# Patient Record
Sex: MALE | Race: BLACK OR AFRICAN AMERICAN
[De-identification: names, ages, dates, MRNs, and addresses within clinical notes are randomized per-mention and may not be internally consistent; named-entity substitution may affect disease eponyms.]

---

## 2019-02-06 ENCOUNTER — HOSPITAL ENCOUNTER (EMERGENCY)
Dept: HOSPITAL 12 - ER | Age: 59
LOS: 1 days | Discharge: HOME | End: 2019-02-07
Payer: MEDICAID

## 2019-02-06 VITALS — HEIGHT: 70 IN | BODY MASS INDEX: 27.92 KG/M2 | WEIGHT: 195 LBS

## 2019-02-06 DIAGNOSIS — F11.10: ICD-10-CM

## 2019-02-06 DIAGNOSIS — F17.290: ICD-10-CM

## 2019-02-06 DIAGNOSIS — Z59.0: ICD-10-CM

## 2019-02-06 DIAGNOSIS — F20.9: ICD-10-CM

## 2019-02-06 DIAGNOSIS — F15.10: ICD-10-CM

## 2019-02-06 DIAGNOSIS — Z90.49: ICD-10-CM

## 2019-02-06 DIAGNOSIS — F14.10: ICD-10-CM

## 2019-02-06 DIAGNOSIS — Z71.6: ICD-10-CM

## 2019-02-06 DIAGNOSIS — M79.644: Primary | ICD-10-CM

## 2019-02-06 PROCEDURE — A4663 DIALYSIS BLOOD PRESSURE CUFF: HCPCS

## 2019-02-06 SDOH — ECONOMIC STABILITY - HOUSING INSECURITY: HOMELESSNESS: Z59.0

## 2019-02-07 VITALS — SYSTOLIC BLOOD PRESSURE: 141 MMHG | DIASTOLIC BLOOD PRESSURE: 76 MMHG

## 2019-03-29 ENCOUNTER — HOSPITAL ENCOUNTER (EMERGENCY)
Dept: HOSPITAL 54 - ER | Age: 59
Discharge: HOME | End: 2019-03-29
Payer: MEDICAID

## 2019-03-29 VITALS — HEIGHT: 69 IN | WEIGHT: 177 LBS | BODY MASS INDEX: 26.22 KG/M2

## 2019-03-29 VITALS — DIASTOLIC BLOOD PRESSURE: 87 MMHG | SYSTOLIC BLOOD PRESSURE: 137 MMHG

## 2019-03-29 DIAGNOSIS — Z90.89: ICD-10-CM

## 2019-03-29 DIAGNOSIS — M79.672: Primary | ICD-10-CM

## 2019-03-29 DIAGNOSIS — M79.671: ICD-10-CM

## 2019-03-29 DIAGNOSIS — J45.909: ICD-10-CM

## 2019-03-29 DIAGNOSIS — Z59.0: ICD-10-CM

## 2019-03-29 SDOH — ECONOMIC STABILITY - HOUSING INSECURITY: HOMELESSNESS: Z59.0

## 2019-03-29 NOTE — NUR
PT PRESENTED TO THE ER WITH A C/O BLE PAIN. PT AMBULATED TO ER 18 WITH A STEADY 
GAIT. DR. ISAACS IS SPEAKING TO THE PT.

## 2019-03-29 NOTE — NUR
Patient given written and verbal discharge instructions. Patient verbalizes 
understanding of instructions. Patient is ambulatory with steady gait. Refuses 
offer of shelter placement. Patient given list of available shelters in 
surrounding area. VSS. ARM BAND REMOVED. PT REC'D FOOD PRIOR TO LEAVING.

## 2019-07-19 ENCOUNTER — HOSPITAL ENCOUNTER (EMERGENCY)
Dept: HOSPITAL 54 - ER | Age: 59
Discharge: HOME | End: 2019-07-19
Payer: COMMERCIAL

## 2019-07-19 VITALS — DIASTOLIC BLOOD PRESSURE: 101 MMHG | SYSTOLIC BLOOD PRESSURE: 144 MMHG

## 2019-07-19 VITALS — BODY MASS INDEX: 22.22 KG/M2 | HEIGHT: 69 IN | WEIGHT: 150 LBS

## 2019-07-19 DIAGNOSIS — F31.9: ICD-10-CM

## 2019-07-19 DIAGNOSIS — M79.671: ICD-10-CM

## 2019-07-19 DIAGNOSIS — M79.672: Primary | ICD-10-CM
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
## 2019-07-19 DIAGNOSIS — F20.9: ICD-10-CM

## 2019-07-19 DIAGNOSIS — J45.909: ICD-10-CM

## 2019-07-19 DIAGNOSIS — Z90.89: ICD-10-CM

## 2019-07-19 DIAGNOSIS — Z59.0: ICD-10-CM

## 2019-07-19 DIAGNOSIS — F10.10: ICD-10-CM

## 2019-07-19 SDOH — ECONOMIC STABILITY - HOUSING INSECURITY: HOMELESSNESS: Z59.0

## 2019-08-02 ENCOUNTER — HOSPITAL ENCOUNTER (EMERGENCY)
Dept: HOSPITAL 54 - ER | Age: 59
Discharge: HOME | End: 2019-08-02
Payer: MEDICAID

## 2019-08-02 VITALS — WEIGHT: 160 LBS | HEIGHT: 69 IN | BODY MASS INDEX: 23.7 KG/M2

## 2019-08-02 VITALS — DIASTOLIC BLOOD PRESSURE: 97 MMHG | SYSTOLIC BLOOD PRESSURE: 160 MMHG

## 2019-08-02 DIAGNOSIS — J45.909: ICD-10-CM

## 2019-08-02 DIAGNOSIS — I10: ICD-10-CM

## 2019-08-02 DIAGNOSIS — Z90.89: ICD-10-CM

## 2019-08-02 DIAGNOSIS — F17.200: ICD-10-CM

## 2019-08-02 DIAGNOSIS — R05: Primary | ICD-10-CM

## 2019-08-02 DIAGNOSIS — Z59.0: ICD-10-CM

## 2019-08-02 DIAGNOSIS — F31.9: ICD-10-CM

## 2019-08-02 DIAGNOSIS — F10.10: ICD-10-CM

## 2019-08-02 DIAGNOSIS — F20.9: ICD-10-CM
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
## 2019-08-02 SDOH — ECONOMIC STABILITY - HOUSING INSECURITY: HOMELESSNESS: Z59.0

## 2019-08-02 NOTE — NUR
BIBSELF, WALKED IN TO ER. TO ER BED 9. AAOX4. NO RESP DISTRESS NOTED. 
AMBULATORY. C/O HAVING HARD TIME BREATHING AND FLU LIKE SYMPTOMS. PT REPORTS 
HAVING PRODUCTIVE COUGH W/ GREEN SPUTUM. PT IS AFEBRILE. LUNGS SOUNDS ARE CLEAR 
UPON AUSCULTATION. AWAITING MD FOR EVAL AND ORDERS.

## 2019-10-22 ENCOUNTER — HOSPITAL ENCOUNTER (EMERGENCY)
Dept: HOSPITAL 54 - ER | Age: 59
Discharge: HOME | End: 2019-10-22
Payer: MEDICAID

## 2019-10-22 VITALS — HEIGHT: 69 IN | WEIGHT: 160 LBS | BODY MASS INDEX: 23.7 KG/M2

## 2019-10-22 VITALS — SYSTOLIC BLOOD PRESSURE: 144 MMHG | DIASTOLIC BLOOD PRESSURE: 81 MMHG

## 2019-10-22 DIAGNOSIS — J45.909: ICD-10-CM

## 2019-10-22 DIAGNOSIS — L29.8: Primary | ICD-10-CM

## 2019-10-22 DIAGNOSIS — Z59.0: ICD-10-CM

## 2019-10-22 DIAGNOSIS — I10: ICD-10-CM

## 2019-10-22 DIAGNOSIS — F17.200: ICD-10-CM

## 2019-10-22 DIAGNOSIS — F20.9: ICD-10-CM

## 2019-10-22 DIAGNOSIS — F10.10: ICD-10-CM

## 2019-10-22 DIAGNOSIS — F31.9: ICD-10-CM

## 2019-10-22 DIAGNOSIS — Z90.89: ICD-10-CM
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
## 2019-10-22 PROCEDURE — 99283 EMERGENCY DEPT VISIT LOW MDM: CPT

## 2019-10-22 SDOH — ECONOMIC STABILITY - HOUSING INSECURITY: HOMELESSNESS: Z59.0

## 2019-12-05 ENCOUNTER — HOSPITAL ENCOUNTER (EMERGENCY)
Dept: HOSPITAL 54 - ER | Age: 59
Discharge: HOME | End: 2019-12-05
Payer: COMMERCIAL

## 2019-12-05 VITALS
WEIGHT: 155 LBS | DIASTOLIC BLOOD PRESSURE: 92 MMHG | BODY MASS INDEX: 22.96 KG/M2 | SYSTOLIC BLOOD PRESSURE: 149 MMHG | HEIGHT: 69 IN

## 2019-12-05 DIAGNOSIS — F17.200: ICD-10-CM

## 2019-12-05 DIAGNOSIS — F20.9: ICD-10-CM

## 2019-12-05 DIAGNOSIS — I10: ICD-10-CM

## 2019-12-05 DIAGNOSIS — Z59.0: ICD-10-CM

## 2019-12-05 DIAGNOSIS — Z90.89: ICD-10-CM

## 2019-12-05 DIAGNOSIS — L29.9: Primary | ICD-10-CM

## 2019-12-05 DIAGNOSIS — J45.909: ICD-10-CM

## 2019-12-05 DIAGNOSIS — F31.9: ICD-10-CM

## 2019-12-05 SDOH — ECONOMIC STABILITY - HOUSING INSECURITY: HOMELESSNESS: Z59.0

## 2019-12-05 NOTE — NUR
Patient discharged to home in stable condition. Written and verbal after care 
instructions given. Patient verbalizes understanding of instruction. Pt 
ambulatory with a steady gait. Pt provided with clean and dry clothes as well 
as food and drinks. Pt signed homeless waiver.